# Patient Record
Sex: MALE | Race: WHITE
[De-identification: names, ages, dates, MRNs, and addresses within clinical notes are randomized per-mention and may not be internally consistent; named-entity substitution may affect disease eponyms.]

---

## 2017-09-20 ENCOUNTER — HOSPITAL ENCOUNTER (INPATIENT)
Dept: HOSPITAL 74 - JER | Age: 37
LOS: 3 days | Discharge: HOME HEALTH | DRG: 710 | End: 2017-09-23
Attending: INTERNAL MEDICINE | Admitting: INTERNAL MEDICINE
Payer: COMMERCIAL

## 2017-09-20 VITALS — BODY MASS INDEX: 35.8 KG/M2

## 2017-09-20 DIAGNOSIS — Z87.891: ICD-10-CM

## 2017-09-20 DIAGNOSIS — Z88.0: ICD-10-CM

## 2017-09-20 DIAGNOSIS — A41.9: Primary | ICD-10-CM

## 2017-09-20 DIAGNOSIS — E11.65: ICD-10-CM

## 2017-09-20 DIAGNOSIS — R00.0: ICD-10-CM

## 2017-09-20 DIAGNOSIS — E66.8: ICD-10-CM

## 2017-09-20 DIAGNOSIS — K61.1: ICD-10-CM

## 2017-09-20 DIAGNOSIS — I10: ICD-10-CM

## 2017-09-20 LAB
ALBUMIN SERPL-MCNC: 3 G/DL (ref 3.4–5)
ALP SERPL-CCNC: 182 U/L (ref 45–117)
ALT SERPL-CCNC: 32 U/L (ref 12–78)
ANION GAP SERPL CALC-SCNC: 8 MMOL/L (ref 8–16)
APPEARANCE UR: CLEAR
APTT BLD: 27.8 SECONDS (ref 26.9–34.4)
AST SERPL-CCNC: 16 U/L (ref 15–37)
BASOPHILS # BLD: 1.3 % (ref 0–2)
BILIRUB SERPL-MCNC: 0.3 MG/DL (ref 0.2–1)
BILIRUB UR STRIP.AUTO-MCNC: NEGATIVE MG/DL
CALCIUM SERPL-MCNC: 8.9 MG/DL (ref 8.5–10.1)
CO2 SERPL-SCNC: 25 MMOL/L (ref 21–32)
COLOR UR: (no result)
CREAT SERPL-MCNC: 0.6 MG/DL (ref 0.7–1.3)
DEPRECATED RDW RBC AUTO: 13 % (ref 11.9–15.9)
EOSINOPHIL # BLD: 0.5 % (ref 0–4.5)
GLUCOSE SERPL-MCNC: 263 MG/DL (ref 74–106)
HIV 1 & 2 AB: NEGATIVE
HIV 1 AGP24: NEGATIVE
INR BLD: 1.03 (ref 0.82–1.09)
KETONES UR QL STRIP: (no result)
LEUKOCYTE ESTERASE UR QL STRIP.AUTO: NEGATIVE
MCH RBC QN AUTO: 29.4 PG (ref 25.7–33.7)
MCHC RBC AUTO-ENTMCNC: 33.8 G/DL (ref 32–35.9)
MCV RBC: 87 FL (ref 80–96)
NEUTROPHILS # BLD: 77.9 % (ref 42.8–82.8)
NITRITE UR QL STRIP: NEGATIVE
PH UR: 5 [PH] (ref 5–8)
PLATELET # BLD AUTO: 281 K/MM3 (ref 134–434)
PMV BLD: 8.3 FL (ref 7.5–11.1)
PROT SERPL-MCNC: 6.8 G/DL (ref 6.4–8.2)
PROT UR QL STRIP: (no result)
PROT UR QL STRIP: NEGATIVE
PT PNL PPP: 11.3 SEC (ref 9.98–11.88)
RBC # UR STRIP: NEGATIVE /UL
SP GR UR: 1.01 (ref 1–1.02)
UROBILINOGEN UR STRIP-MCNC: NEGATIVE MG/DL (ref 0.2–1)
WBC # BLD AUTO: 12.4 K/MM3 (ref 4–10)

## 2017-09-20 PROCEDURE — G0008 ADMIN INFLUENZA VIRUS VAC: HCPCS

## 2017-09-20 RX ADMIN — INSULIN ASPART SCH UNITS: 100 INJECTION, SOLUTION INTRAVENOUS; SUBCUTANEOUS at 21:47

## 2017-09-20 RX ADMIN — METRONIDAZOLE SCH MLS/HR: 500 INJECTION, SOLUTION INTRAVENOUS at 18:08

## 2017-09-20 NOTE — HP
CHIEF COMPLAINT: perianal pain 





PCP: None 





HISTORY OF PRESENT ILLNESS:


36 yo M with no signifcant PMHx presents to ED with one week history of 

worsening perianal pain. Approximately 5 days prior to visit he started with 

pulsating pain of his left medial gluteus that subsequently became more painful 

and red. He went to walk in clinic and was given hydrocortisone cream for the 

redness. Pain worsened and redness spread. He then developed a draining 

abscess. For past day pain had gotten to point where he could no longer sit and 

even ambulating became difficult, which prompted trip to ED. He endorses 

subjective fevers and chills. No prior similar events in past. Denies CP, HA, 

SOB, palpitations, abdominal pain, N/V. No sick contacts or recents travel. 





ER course was notable for:


(1)CBC shows WBC - 12.4 and tachycardic HR 99 = sepsis 


(2)CT A/P shows inflammatory changes but no discrete abscess. 


(3)Hyperglycemia- random glucose 263





Recent Travel:Denies





PAST MEDICAL HISTORY:none





PAST SURGICAL HISTORY:none





Social History:


Smoking:never 


Alcohol:socially 


Drugs: denies





Family History: non-contributory 


Allergies





Penicillins Allergy (Mild, Verified 09/20/17 12:30)


 Hives








HOME MEDICATIONS:


 Home Medications











 Medication  Instructions  Recorded


 


NK [No Known Home Medication]  09/20/17








REVIEW OF SYSTEMS


CONSTITUTIONAL: fever, chills, diaphoresis


Absent:  , generalized weakness, malaise, loss of appetite, weight change


HEENT: 


Absent:  rhinorrhea, nasal congestion, throat pain, throat swelling, difficulty 

swallowing, mouth swelling, ear pain, eye pain, visual changes


CARDIOVASCULAR: 


Absent: chest pain, syncope, palpitations, irregular heart rate, lightheadedness

, peripheral edema


RESPIRATORY: 


Absent: cough, shortness of breath, dyspnea with exertion, orthopnea, wheezing, 

stridor, hemoptysis


GASTROINTESTINAL:


Absent: abdominal pain, abdominal distension, nausea, vomiting, diarrhea, 

constipation, melena, hematochezia


GENITOURINARY: 


Absent: dysuria, frequency, urgency, hesitancy, hematuria, flank pain, genital 

pain


MUSCULOSKELETAL: 


Absent: myalgia, arthralgia, joint swelling, back pain, neck pain


SKIN: painful lesion of left buttocks. 


Absent: rash, itching, pallor


HEMATOLOGIC/IMMUNOLOGIC: 


Absent: easy bleeding, easy bruising, lymphadenopathy, frequent infections


ENDOCRINE:


Absent: unexplained weight gain, unexplained weight loss, heat intolerance, 

cold intolerance


NEUROLOGIC: 


Absent: headache, focal weakness or paresthesias, dizziness, unsteady gait, 

seizure, mental status changes, bladder or bowel incontinence


PSYCHIATRIC: 


Absent: anxiety, depression, suicidal or homicidal ideation, hallucinations.








PHYSICAL EXAMINATION





GENERAL: AAOx3, mild distress


HEAD: NC/AT


EYES:PERRLA, EOMI, sclera anicteric, conjunctiva clear. No lid lag.


EARS, NOSE, THROAT: Dry mucous membranes.


NECK: supple, No jvd


LUNGS: CTAB. No wheezes, and no crackles. No accessory muscle use.


HEART: RRR, S1S2 normal , no m/g/r


ABDOMEN: Soft, nontender, not distended, normoactive bowel sounds, no guarding, 

no rebound, no masses.  No hepatomegaly or  splenomegaly. 


MUSCULOSKELETAL: Normal range of motion at all joints. No bony deformities or 

tenderness. No CVA tenderness.


UPPER EXTREMITIES: 2+ pulses, warm, well-perfused. No cyanosis. No clubbing. No 

peripheral edema.


LOWER EXTREMITIES: 2+ pulses, warm, well-perfused. No calf tenderness. No 

peripheral edema. 


NEUROLOGICAL:  Cranial nerves II-XII intact. Normal speech. gait po


PSYCHIATRIC: Cooperative. Good eye contact. Appropriate mood and affect.


SKIN: right gluteal draining 2x2cm abscess with surrounding erythema. 





ASSESSMENT/PLAN:


36 yo M with no signifcant PMHx presents to ED with one week history of 

worsening perianal pain admitted to med/sug for sepsis secondary to rey-anal 

abscess. 





Problem List





- Problem


(1) Sepsis


Assessment/Plan: 


* secondary to rey-anal abscess. 


* Blood and wound culture sent. 


* Repeat Lactic acid pending. 


* Started on Levaquin, Zosyn, and metronidazole in ED will continue Levaquin 

and Flagyl 


* IVF NS 1L Bolus x 2 bags.


* Pain control with morphine 4mg Q4H


* Dr. Garcia consulted for possible  I&D 











(2) Hyperglycemia


Assessment/Plan: 


* HbgA1C pending 


* Diabetic diet. 











Visit type





- Emergency Visit


Emergency Visit: Yes


ED Registration Date: 09/20/17


Care time: The patient presented to the Emergency Department on the above date 

and was hospitalized for further evaluation of their emergent condition.





- New Patient


This patient is new to me today: Yes


Date on this admission: 09/20/17





- Critical Care


Critical Care patient: No

## 2017-09-20 NOTE — PN
Teaching Attending Note


Name of Resident: Valentin Osei


ATTENDING PHYSICIAN STATEMENT





I saw and evaluated the patient.


I reviewed the resident's note and discussed the case with the resident.


I agree with the resident's findings and plan as documented.








SUBJECTIVE:38yo M with no PMH presented with buttock pain x 6 days. pt states 

pain has been progressively worsening since then and then noticed. noted 

yesterday it started draining some fluid. pt states he never had similar 

episode in the past. 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 

stretches. denies CP, SOB, fever, chills, N/V/C/D. was told 6 years ago he was 

diabetic and was started on medication (does not recall name) but states he 

started eating healthy and exercising and lost 44 lbs then stopped taking the 

medication for himself. states he is only sexually active with his wife. denies 

IVDA or foreign body insertion into his rectum.  








OBJECTIVE:


 Last Vital Signs











Temp Pulse Resp BP Pulse Ox


 


 99.2 F   86   18   141/88   98 


 


 09/20/17 15:25  09/20/17 15:25  09/20/17 15:25  09/20/17 15:25  09/20/17 15:42








General NAD


Skin L buttock with 3x3 cm abscess with active pus drainage tender to light 

palpation with large surrounding area of erythema





ASSESSMENT AND PLAN:


38yo M with no PMH presented with R buttock pain


1. Sepsis due to L buttock abscess and surrounding erythema- Medicine 

admission. (leukocytosis and tachycardia) CT negative for discrete abscess 

however active drainage is required. Surgery consulted for I&D. NPO tonight for 

likely I&D in the AM. started on levaquin and flagyl due to PCN allergy. start 

IVF. lactic acid normal. pain contro. Cx obtained.  pt consented to HIV 

testing. check HIV status.


2. Hypergylcemia- is likely diabetic as also has glucosuria. check A1c. start 

BGM and ISS at this time. 


3. DVT ppx- lovenox

## 2017-09-20 NOTE — PDOC
History of Present Illness





- General


Chief Complaint: Abscess Boil


Stated Complaint: INFECTION ON BUTTOCKS


Time Seen by Provider: 09/20/17 11:30


History Source: Patient


Exam Limitations: No Limitations





- History of Present Illness


Initial Comments: 





09/20/17 12:27


37 yr male with c/o painful abscess to rectal area started 5-7 days ago now 

worse and draining with chills last night. Pt has neg abd pain or vomiting, no 

past medical history. Pt works at a desk sitting long hours. Pt has no PMD, was 

seen at the clinic last friday . 


Timing/Duration: reports: week


Severity: Yes: moderate


Location: reports: other (rectal/perianal )





Past History





- Past Medical History


Allergies/Adverse Reactions: 


 Allergies











Allergy/AdvReac Type Severity Reaction Status Date / Time


 


Penicillins Allergy Mild Hives Verified 09/20/17 12:30











Home Medications: 


Ambulatory Orders





NK [No Known Home Medication]  09/20/17 











- Suicide/Smoking/Psychosocial Hx


Smoking History: Former smoker


Have you smoked in the past 12 months: No


If you are a former smoker, when did you quit?: 13 YRS AGO


Information on smoking cessation initiated: No


Hx Alcohol Use: No


Drug/Substance Use Hx: No


Substance Use Type: None





**Review of Systems





- Review of Systems


Able to Perform ROS?: Yes


Is the patient limited English proficient: No


Constitutional: Yes: Symptoms Reported, Chills


HEENTM: No: Symptoms Reported


Respiratory: No: Symptoms reported


Cardiac (ROS): No: Symptoms Reported


ABD/GI: Yes: Symptoms Reported, See HPI, Constipated


: No: Symptoms Reported


Musculoskeletal: No: Symptoms Reported


Integumentary: Yes: See HPI





*Physical Exam





- Vital Signs


 Last Vital Signs











Temp Pulse Resp BP Pulse Ox


 


 98.4 F   99 H  20   146/105   97 


 


 09/20/17 10:38  09/20/17 10:38  09/20/17 10:38  09/20/17 10:38  09/20/17 10:38














- Physical Exam


General Appearance: Yes: Nourished, Appropriately Dressed


HEENT: positive: EOMI, CLARENCE, Normal ENT Inspection, TMs Normal, Pharynx Normal


Neck: positive: Supple.  negative: Tender


Respiratory/Chest: positive: Lungs Clear, Normal Breath Sounds


Cardiovascular: positive: Regular Rhythm, Regular Rate


Gastrointestinal/Abdominal: positive: Normal Bowel Sounds, Soft.  negative: 

Tender


Male Genitalia: positive: normal genitalia


Rectal Exam: positive: other (tender indurated mass to the left perirectal area 

approximately 6cm extending to the buttock with drainage , )


Musculoskeletal: positive: Normal Inspection


Extremity: positive: Normal Inspection, Normal Range of Motion


Integumentary: positive: Normal Color, Dry, Warm


Neurologic: positive: Fully Oriented, Alert, Normal Mood/Affect, Normal Response

, Motor Strength 5/5





ED Treatment Course





- LABORATORY


CBC & Chemistry Diagram: 


 09/21/17 06:00





 09/21/17 06:00





- ADDITIONAL ORDERS


Additional order review: 


 Laboratory  Results











  09/20/17





  11:57


 


PT with INR  11.30


 


INR  1.03


 


PTT (Actin FS)  27.8








 











  09/20/17





  11:57


 


RBC  5.08


 


MCV  87.0


 


MCHC  33.8


 


RDW  13.0


 


MPV  8.3


 


Neutrophils %  77.9


 


Lymphocytes %  11.6


 


Monocytes %  8.7


 


Eosinophils %  0.5


 


Basophils %  1.3














- RADIOLOGY


Radiology Studies Ordered: 














 Category Date Time Status


 


 ABDOMEN & PELVIS CT WITH CONTR [CT] Stat CT Scan  09/20/17 11:39 Ordered














- Medications


Given in the ED: 


ED Medications














Discontinued Medications














Generic Name Dose Route Start Last Admin





  Trade Name Freq  PRN Reason Stop Dose Admin


 


Ampicillin Sodium/Sulbactam  100 mls @ 200 mls/hr 09/20/17 11:44 09/20/17 12:16





  Sodium 3 gm/ Sodium Chloride  IVPB 09/20/17 12:13  Not Given





  ONCE ONE   














- Consult/PCP


Time Called: 13:05


Case discussed with personal care physician: Aaron Perez


Consult Reason/Comments: case discussed and will admit , will call 





- Additional Consults


Time Called: 13:14


Consult/PCP:  





Progress Note





- Progress Note


Progress Note: 


after ordering Unaysn pt told the RN he was allergic to PCN. I have canceled 

and ordered levaquin and Flagyl. 














Medical Decision Making





- Medical Decision Making


09/20/17 12:29


cc: rectal pain with abscess for one week had chills and drainage start last 

night


pt is unable to sit or lie on back due to pain 


pt denies and PMH


will do labs, IVAB , Ct pelvis to r/o rectal abscess


pt has induration with fluctuance to the per rectal area extending to the  left 

buttock 


 case discussed in detail with ER attending Dr. Carlos and agrees with plan to 

admit for IVAB


      





09/20/17 12:48


after ordering Unaysn pt told the RN he was allergic to PCN. I have canceled 

and ordered Levaquin and Flagyl. 





09/20/17 14:38


 called the ER states the patient has no insurance so pt is to be 

admitted to the hospitalist. I have spoke to 's office and I have 

canceled  as the pt is no longer admitted to . 





I have spoke to  admitting hospitalist and has accepted the 

patient. 


I have spoke to on call surgery  and he will consult on the patient. 





09/20/17 14:54








*DC/Admit/Observation/Transfer


Diagnosis at time of Disposition: 


 Perirectal abscess





- Discharge Dispostion


Admit: Yes





- Referrals

## 2017-09-20 NOTE — HP
CHIEF COMPLAINT: big bubble in his buttocks





PCP: Aaron Perez MD 





HISTORY OF PRESENT ILLNESS:


38 YO male with no past medical history , who presented to [facility] ed with one week 

history of rey-anal abscess . He noticed some redness on his buttocks last 

week , that prompted him to see , within 3 days it compose a 2x2 cm 

abscess that start draining some pussy fluids, and blood . patient reports 

fever and chills last night, associated with pain in the area 8/10 improved 

with pain killer and loss of appetite for 3 days because he is scared to use 

the bathroom. Patient was found to be tachycardic of 86, and wbc 12.4 and he 

was admitted to med-surg for sepsis. Patient denies any chest pain, SOB, 

abdominal pain, N/V/D/C, or any urinary symptoms. Patient has a history of 

using diabetes medicine for 3 months but does not know the name, and was 

advised by his doctor to loose weight. Patient is  and only have sex 

with his wife.  he denies any recent travel or sick contact , but has an office 

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


ER course was notable for:


(1) wbc 12.4, HR 86, /88, Glu 263


(2) 1000 CC NS bolus 


(3) CT A/P shows inflammatory changes but no discrete abscess. 





Recent Travel: NO 





PAST MEDICAL HISTORY: pre-diabetics .





PAST SURGICAL HISTORY:None





Social History:


Smoking:never


Alcohol: socially


Drugs: never





Family History: Father has DM and HTN. 


Allergies





Penicillins Allergy (Mild, Verified 09/20/17 12:30)


 Hives








HOME MEDICATIONS:


 Home Medications











 Medication  Instructions  Recorded


 


NK [No Known Home Medication]  09/20/17








REVIEW OF SYSTEMS


CONSTITUTIONAL: 


Absent:  fever, chills, diaphoresis, generalized weakness, malaise, loss of 

appetite, weight change


HEENT: 


Absent:  rhinorrhea, nasal congestion, throat pain, throat swelling, difficulty 

swallowing, mouth swelling, ear pain, eye pain, visual changes


CARDIOVASCULAR: 


Absent: chest pain, syncope, palpitations, irregular heart rate, lightheadedness

, peripheral edema


RESPIRATORY: 


Absent: cough, shortness of breath, dyspnea with exertion, orthopnea, wheezing, 

stridor, hemoptysis


GASTROINTESTINAL:


Absent: abdominal pain, abdominal distension, nausea, vomiting, diarrhea, 

constipation, melena, hematochezia


GENITOURINARY: 


Absent: dysuria, frequency, urgency, hesitancy, hematuria, flank pain, genital 

pain


MUSCULOSKELETAL: 


Absent: myalgia, arthralgia, joint swelling, back pain, neck pain


SKIN: 


Absent: rash, itching, pallor, 2x2 cm  draining abscess  on his left buttock  

with erythema in rey anal area.


HEMATOLOGIC/IMMUNOLOGIC: 


Absent: easy bleeding, easy bruising, lymphadenopathy, frequent infections


ENDOCRINE:


Absent: unexplained weight gain, unexplained weight loss, heat intolerance, 

cold intolerance


NEUROLOGIC: 


Absent: headache, focal weakness or paresthesias, dizziness, unsteady gait, 

seizure, mental status changes, bladder or bowel incontinence


PSYCHIATRIC: 


Absent: anxiety, depression, suicidal or homicidal ideation, hallucinations.








PHYSICAL EXAMINATION


 Vital Signs - 24 hr











  09/20/17 09/20/17





  15:25 15:42


 


Temperature 99.2 F 


 


Pulse Rate 86 


 


Respiratory 18 





Rate  


 


Blood Pressure 141/88 


 


O2 Sat by Pulse  98





Oximetry (%)  











GENERAL: Awake, alert, and fully oriented, in no acute distress.


HEAD: Normal with no signs of trauma.


EYES:  sclera anicteric, conjunctiva clear.


EARS, NOSE, THROAT:  Moist mucous membranes.





LUNGS: Breath sounds equal, clear to auscultation bilaterally. No wheezes, and 

no crackles. No accessory muscle use.


HEART: Regular rate and rhythm, normal S1 and S2 without murmur, rub or gallop.


ABDOMEN: Soft, nontender, not distended, normoactive bowel sounds, no guarding, 

no rebound tenderness.


MUSCULOSKELETAL: Normal range of motion at all joints. No bony deformities or 

tenderness. No CVA tenderness.





LOWER EXTREMITIES: 2+ pulses, warm, well-perfused. No calf tenderness. No 

peripheral edema. 


NEUROLOGICAL:  good mentation. 


PSYCHIATRIC: Cooperative. Good eye contact. Appropriate mood and affect.


SKIN: Warm, dry, no rashes or lesions noted, 2x2 cm abscess in hes left 

buttocks with rey anal erythema.





ASSESSMENT/PLAN:


 is 37 year old male with no past medical history who presented to 

the ED with one week history of rey-anal abscess was found to have sepsis and 

was admitted for further evaluation and treatment. 





# Sepsis 2/2 rey-anal abscess


* wbc 12.4 , HR 84, source of infection


* Blood culture,urine culture,  wound  culture pending 


* repeat lctic acid pending


* IV NS @ 1000 CC bolus was given in tenED, will give him another 2000 CC 


* Iv abx levaquin 500 once and 500 flagyl was given in the ED for one dose


* Continue IV abx daily for 7 days (Levaquin 500 mg qd, Flagyl 500 mg IV q 6 hr)

, allergic to penicillin.


* Morphine 1 mg IVbp Q4hr PRN


*   surgery consulted for I&D  


* NPO after mid night for possible OR procedure 


* HIV screening test 


* 


* 


# Hyperglycemia, 


* Glu 263 on admission , 


* UA : +3 glucose, +2 keton


* HgA1c 


* BGM , ISS, 


* will monitor blood sugar


* Patient education 


* encourage weight loss


* 


# High blood pressure, 


* 141/88 on admission 


* monitor Bp 


* Low sodium diet, diabetic diet


* 


#Proph, 


* DVT : Moderate risk , SCDS both legs 


* GI: No need for now


* 


#FEN:


* 2000 CC NS bolus 


* WNL 


* Low sodium, diabetic diet. 


* 


#Dispo: 


* admit to med-surg 





Visit type





- Emergency Visit


Emergency Visit: Yes


ED Registration Date: 09/20/17


Care time: The patient presented to the Emergency Department on the above date 

and was hospitalized for further evaluation of their emergent condition.





- New Patient


This patient is new to me today: Yes


Date on this admission: 09/20/17





- Critical Care


Critical Care patient: No

## 2017-09-20 NOTE — CONSULT
Consult


Consult Specialty:: Surgery


Reason for Consultation:: Left perirectal/perianal abscess





- History of Present Illness


History of Present Illness: 


37 male presents for tenderness, redness x several days at left perirectal/

perianal area


Noticed some discharge form the area


1st episode


Denies history of DM


No subjective fevers/chills





- History Source


History Provided By: Patient, Medical Record


Limitations to Obtaining History: No Limitations





- Past Medical History


Endocrine: Yes: Diabetes Mellitus





- Past Surgical History


Past Surgical History: Yes: None





- Alcohol/Substance Use


Hx Alcohol Use: No





- Smoking History


Smoking history: Former smoker


Have you smoked in the past 12 months: No


If you are a former smoker, when did you quit?: 13 YRS AGO





Home Medications





- Allergies


Allergies/Adverse Reactions: 


 Allergies











Allergy/AdvReac Type Severity Reaction Status Date / Time


 


Penicillins Allergy Mild Hives Verified 09/20/17 12:30














- Home Medications


Home Medications: 


Ambulatory Orders





NK [No Known Home Medication]  09/20/17 











Family Disease History





- Family Disease History


Family History: Denies





Review of Systems





- Review of Systems


Constitutional: denies: Chills, Fever


HENT: reports: No Symptoms


Neck: reports: No Symptoms


Cardiovascular: denies: Chest Pain


Respiratory: denies: Cough


Gastrointestinal: denies: Abdominal Pain


Genitourinary: reports: No Symptoms


Integumentary: reports: Erythema, Other (+ tenderness at left perirectal area)


Neurological: denies: Change in LOC


Pain Intensity: 4





Physical Exam


Vital Signs: 


 Vital Signs











Temperature  99.2 F   09/20/17 15:25


 


Pulse Rate  86   09/20/17 15:25


 


Respiratory Rate  18   09/20/17 15:25


 


Blood Pressure  141/88   09/20/17 15:25


 


O2 Sat by Pulse Oximetry (%)  98   09/20/17 15:42











Constitutional: Yes: Calm.  No: No Distress


Neck: Yes: WNL


Cardiovascular: Yes: Regular Rate and Rhythm


Respiratory: Yes: Regular


Gastrointestinal: Yes: Soft.  No: Tenderness, Tenderness, Rebound


Extremities: Yes: WNL


Integumentary: Yes: Other (+ erythema and tenderness at left perirectal area, 

unable to perform rectal exam due to tenderness, + some purulent discharge and 

induration)


Neurological: Yes: Alert, Oriented


Labs: 


 CBC, BMP





 09/20/17 11:57 





 09/20/17 11:57 











Imaging





- Results


Cat Scan: Report Reviewed, Image Reviewed





Problem List





- Problems


(1) Perirectal abscess


Code(s): K61.1 - RECTAL ABSCESS








Assessment/Plan


37 male with left perirectal abscess


NPO


IV fluids


Antibiotics


For Incision and Drainage in am


Risks and benefits explained


Agrees

## 2017-09-20 NOTE — HP
Admitting History and Physical





- Admission


Chief Complaint: rectal pain 5 days.  swelling.  mistaking for hemorroids.  wbc 

high


History of Present Illness: 


glucose ? high ? dm


History Source: Patient


Limitations to Obtaining History: No Limitations





- Past Medical History


Endocrine: Yes: Diabetes Mellitus





- Past Surgical History


Past Surgical History: Yes: None





- Smoking History


Smoking history: Former smoker


Have you smoked in the past 12 months: No


If you are a former smoker, when did you quit?: 13 YRS AGO





- Alcohol/Substance Use


Hx Alcohol Use: No





Home Medications





- Allergies


Allergies/Adverse Reactions: 


 Allergies











Allergy/AdvReac Type Severity Reaction Status Date / Time


 


Penicillins Allergy Mild Hives Verified 09/20/17 12:30














- Home Medications


Home Medications: 


Ambulatory Orders





NK [No Known Home Medication]  09/20/17 











Physical Examination


Vital Signs: 


 Vital Signs











Temperature  98.4 F   09/20/17 10:38


 


Pulse Rate  99 H  09/20/17 10:38


 


Respiratory Rate  20   09/20/17 10:38


 


Blood Pressure  146/105   09/20/17 10:38


 


O2 Sat by Pulse Oximetry (%)  97   09/20/17 10:38

## 2017-09-21 LAB
ANION GAP SERPL CALC-SCNC: 8 MMOL/L (ref 8–16)
BASOPHILS # BLD: 0.6 % (ref 0–2)
CALCIUM SERPL-MCNC: 8.4 MG/DL (ref 8.5–10.1)
CO2 SERPL-SCNC: 27 MMOL/L (ref 21–32)
CREAT SERPL-MCNC: 0.5 MG/DL (ref 0.7–1.3)
DEPRECATED RDW RBC AUTO: 13.1 % (ref 11.9–15.9)
EOSINOPHIL # BLD: 1.5 % (ref 0–4.5)
GLUCOSE SERPL-MCNC: 187 MG/DL (ref 74–106)
MCH RBC QN AUTO: 29.6 PG (ref 25.7–33.7)
MCHC RBC AUTO-ENTMCNC: 33.9 G/DL (ref 32–35.9)
MCV RBC: 87.2 FL (ref 80–96)
NEUTROPHILS # BLD: 68.9 % (ref 42.8–82.8)
PLATELET # BLD AUTO: 281 K/MM3 (ref 134–434)
PMV BLD: 8.5 FL (ref 7.5–11.1)
WBC # BLD AUTO: 7 K/MM3 (ref 4–10)

## 2017-09-21 PROCEDURE — 0JB90ZZ EXCISION OF BUTTOCK SUBCUTANEOUS TISSUE AND FASCIA, OPEN APPROACH: ICD-10-PCS | Performed by: SURGERY

## 2017-09-21 PROCEDURE — 0D9P0ZX DRAINAGE OF RECTUM, OPEN APPROACH, DIAGNOSTIC: ICD-10-PCS | Performed by: SURGERY

## 2017-09-21 RX ADMIN — INSULIN ASPART SCH: 100 INJECTION, SOLUTION INTRAVENOUS; SUBCUTANEOUS at 12:31

## 2017-09-21 RX ADMIN — INSULIN ASPART SCH UNIT: 100 INJECTION, SOLUTION INTRAVENOUS; SUBCUTANEOUS at 21:04

## 2017-09-21 RX ADMIN — METRONIDAZOLE SCH MLS/HR: 500 INJECTION, SOLUTION INTRAVENOUS at 02:30

## 2017-09-21 RX ADMIN — METRONIDAZOLE SCH MLS/HR: 500 INJECTION, SOLUTION INTRAVENOUS at 18:33

## 2017-09-21 RX ADMIN — INSULIN DETEMIR SCH UNIT: 100 INJECTION, SOLUTION SUBCUTANEOUS at 21:03

## 2017-09-21 RX ADMIN — HYDROMORPHONE HYDROCHLORIDE PRN MG: 1 INJECTION, SOLUTION INTRAMUSCULAR; INTRAVENOUS; SUBCUTANEOUS at 16:35

## 2017-09-21 RX ADMIN — INSULIN ASPART SCH: 100 INJECTION, SOLUTION INTRAVENOUS; SUBCUTANEOUS at 16:45

## 2017-09-21 RX ADMIN — INSULIN ASPART SCH: 100 INJECTION, SOLUTION INTRAVENOUS; SUBCUTANEOUS at 06:22

## 2017-09-21 RX ADMIN — METRONIDAZOLE SCH MLS/HR: 500 INJECTION, SOLUTION INTRAVENOUS at 10:22

## 2017-09-21 NOTE — EKG
Test Reason : 

Blood Pressure : ***/*** mmHG

Vent. Rate : 087 BPM     Atrial Rate : 087 BPM

   P-R Int : 164 ms          QRS Dur : 092 ms

    QT Int : 360 ms       P-R-T Axes : 028 008 004 degrees

   QTc Int : 433 ms

 

NORMAL SINUS RHYTHM

NORMAL ECG

NO PREVIOUS ECGS AVAILABLE

Confirmed by LEXI MUHAMMAD MD ([id number]) on 9/21/2017 2:40:59 PM

 

Referred By:             Confirmed By:LEXI MUHAMMAD MD

## 2017-09-21 NOTE — OP
Operative Note





- Note:


Operative Date: 09/21/17


Pre-Operative Diagnosis: Left perirectal abscess


Operation: Incision and drainage of left perirectal abscess, excisional 

debridement of necrotic subcutaeous tissue


Findings: 


Large abscess cavity


Approximately 8cm x 8cm x 8cm


Post-Operative Diagnosis: Same as Pre-op


Surgeon: Tc Garcia


Anesthesia: General


Specimens Removed: Abscess cavity content, culture, necrotic subcutaneous tissue


Estimated Blood Loss (mls): 5


Operative Report Dictated: Yes

## 2017-09-21 NOTE — PN
Teaching Attending Note


Name of Resident: Valentin Osei


ATTENDING PHYSICIAN STATEMENT





I saw and evaluated the patient.


I reviewed the resident's note and discussed the case with the resident.


I agree with the resident's findings and plan as documented.








SUBJECTIVE:pt was seen prior to I&D


states pain is controlled as long as he does not lay on that side. denies Cp, 

SOB, fever, chills, N/V/C/D








OBJECTIVE:


 Last Vital Signs











Temp Pulse Resp BP Pulse Ox


 


 98.7 F   71   18   118/75   98 


 


 09/21/17 10:38  09/21/17 10:38  09/21/17 10:38  09/21/17 10:38  09/21/17 09:00








General NAD


Skin L buttock with 3x3 cm abscess with active pus drainage tender to light 

palpation with large surrounding area of erythema





ASSESSMENT AND PLAN:


38yo M with no PMH presented with R buttock pain


1. Sepsis due to L buttock abscess and surrounding erythema- increased drainage 

from abscess. NPO for OR and I&D. will f/u Cx report. on Levaquin and Flagyl 

day 2. HIV negative. pain control. 


2. New onset DM- A1c 10.9. counseled on risks of uncontrolled DM including but 

not limited to vision loss, ACS, kidney injury which can lead to HD and 

uncontrollable infections which can lead to amputations. nutrition consult. 

start levemir 5 units tonight as is insulin naive. marycruz BGM and iss. diabetic 

diet.


3. DVT ppx- lovenox

## 2017-09-21 NOTE — PN
Physical Exam: 


SUBJECTIVE: Patient seen and examined at bedside. No acute events over night. 

buttock pain is controlled. He denies any fever, chills, N/V/D/C. 


the abscess still draining fluids and puss.








OBJECTIVE:





 Vital Signs











 Period  Temp  Pulse  Resp  BP Sys/Morejon  Pulse Ox


 


 Last 24 Hr  97.8 F-99.2 F  69-86  18-20  118-141/70-88  98-99











GENERAL: The patient is awake, alert, and fully oriented, in no acute distress.


HEAD: Normal with no signs of trauma.


EYES:  sclera anicteric, conjunctiva clear. No ptosis. 


ENT:  moist mucous membranes.


LUNGS: Breath sounds equal, clear to auscultation bilaterally, no wheezes, no 

crackles, no 


accessory muscle use. 


HEART: Regular rate and rhythm, S1, S2 without murmur, rub or gallop.


ABDOMEN: Soft, nontender, nondistended, normoactive bowel sounds, no guarding, 

no 


rebound tenderness.


EXTREMITIES:  warm, well-perfused, no edema. 


NEUROLOGICAL: good mentation 


PSYCH: Normal mood, normal affect.


SKIN: Warm, dry,  no rashes or lesions noted, 3x3 cm rey-rectal draining 

abscess with 8x8 cm erythema and cellulitis.














 Laboratory Results - last 24 hr











  09/20/17 09/20/17 09/21/17





  17:00 21:46 06:00


 


WBC    7.0  D


 


RBC    4.73


 


Hgb    14.0


 


Hct    41.2


 


MCV    87.2


 


MCH    29.6


 


MCHC    33.9


 


RDW    13.1


 


Plt Count    281


 


MPV    8.5


 


Neutrophils %    68.9


 


Lymphocytes %    18.8  D


 


Monocytes %    10.2


 


Eosinophils %    1.5  D


 


Basophils %    0.6


 


Sodium   


 


Potassium   


 


Chloride   


 


Carbon Dioxide   


 


Anion Gap   


 


BUN   


 


Creatinine   


 


POC Glucometer   299 


 


Random Glucose   


 


Hemoglobin A1c %   


 


Calcium   


 


HIV 1&2 Antibody Screen  Negative  


 


HIV P24 Antigen  Negative  














  09/21/17 09/21/17 09/21/17





  06:00 06:00 06:20


 


WBC   


 


RBC   


 


Hgb   


 


Hct   


 


MCV   


 


MCH   


 


MCHC   


 


RDW   


 


Plt Count   


 


MPV   


 


Neutrophils %   


 


Lymphocytes %   


 


Monocytes %   


 


Eosinophils %   


 


Basophils %   


 


Sodium  141  


 


Potassium  4.2  


 


Chloride  106  


 


Carbon Dioxide  27  


 


Anion Gap  8  


 


BUN  10  


 


Creatinine  0.5 L  


 


POC Glucometer    182


 


Random Glucose  187 H D  


 


Hemoglobin A1c %   10.9 H 


 


Calcium  8.4 L  


 


HIV 1&2 Antibody Screen   


 


HIV P24 Antigen   








Active Medications











Generic Name Dose Route Start Last Admin





  Trade Name Freq  PRN Reason Stop Dose Admin


 


Fentanyl  50 mcg 09/21/17 12:39  





  Sublimaze Injection -  IVPUSH 09/24/17 12:40  





  [id number] PRN   





  PAIN   


 


Metronidazole  100 mls @ 100 mls/hr 09/20/17 18:00 09/21/17 10:22





  Flagyl 500mg Premixed Ivpb -  IVPB   100 mls/hr





  Q8H-IV ROSS   Administration


 


Levofloxacin  150 mls @ 100 mls/hr 09/21/17 10:00 09/21/17 10:22





  Levaquin 750 Mg Premixed Ivpb -  IVPB   100 mls/hr





  DAILY ROSS   Administration


 


Sodium Chloride  1,000 mls @ 125 mls/hr 09/20/17 23:00 09/20/17 23:29





  Normal Saline -  IV   125 mls/hr





  ASDIR ROSS   Administration


 


Lactated Ringer's  1,000 mls @ 75 mls/hr 09/21/17 12:45  





  Lactated Ringers Solution  IV   





  ASDIR ROSS   


 


Insulin Aspart  1 vial 09/20/17 22:00 09/21/17 12:31





  Novolog Vial Sliding Scale -  SQ   Not Given





  ACHS ROSS   





  Protocol   


 


Morphine Sulfate  1 mg 09/20/17 16:22  





  Morphine Injection -  IVPUSH   





  Q4H PRN   





  PAIN   


 


Ondansetron HCl  4 mg 09/21/17 12:39  





  Zofran Injection  IVPUSH 09/21/17 18:40  





  Q6H PRN   





  NAUSEA AND/OR VOMITING   








 CBC, BMP





 09/21/17 06:00 





 09/21/17 06:00 








Pelvic CT scan: 9/20/2017: No pelvic mass or fluid collection, no 

lymphadenopathy. inflammatory process without discrete abscess.





ASSESSMENT/PLAN:


 is 37 year old male with no past medical history who presented to 

the ED with one week history of rey-anal abscess was found to have sepsis and 

was admitted for further evaluation and treatment. 





# Sepsis 2/2 rey-anal abscess and cellulitis 


* day of admission wbc 12.4 , HR 84, with source of infection, improving to wbc 

7,  HR 80


* Blood culture,urine culture,  wound  culture pending 


* Iv abx levaquin 500 once and 500 flagyl was given in the ED for one dose


* Continue IV abx daily for 7 days (Levaquin 500 mg qd, Flagyl 500 mg IV q 6 hr)

, allergic to penicillin.


* Morphine 1 mg IVbp Q4hr PRN


*  surgery consulted for I&D  : Incision and drainage of left perirectal 

abscess, excisional debridement of necrotic subcutaeous tissue


* HIV screening test negative 


* Zofran for nausea


* 


# Diabetes, new onset  


* Glu 263 on admission , 


* UA : +3 glucose, +2 keton


* HgA1c 10.9


* start levemir 5 unit SQ HS


* BGM , ISS, 


* will monitor blood sugar


* Patient education for short and long term complications  


* encourage weight loss


* Nutritionist consultation 


* 


# elevated blood pressure, resolved likely 2/2 pain 


* 141/88 on admission , normotensive today 118/78


* monitor Bp 


* Low sodium diet, diabetic diet


* 


#Proph, 


* DVT : Moderate risk , SCDS both legs , lovenox 40 mg SQ daily.


* GI: No need for now


* 


#FEN:


* 2000 CC NS bolus at admission day , on no fluid now 


* WNL 


* Low sodium, diabetic diet. 


* 


#Dispo: 


* admit to med-surg 





Visit type





- Emergency Visit


Emergency Visit: Yes


ED Registration Date: 09/20/17


Care time: The patient presented to the Emergency Department on the above date 

and was hospitalized for further evaluation of their emergent condition.





- New Patient


This patient is new to me today: No





- Critical Care


Critical Care patient: No

## 2017-09-22 LAB
ANION GAP SERPL CALC-SCNC: 13 MMOL/L (ref 8–16)
BASOPHILS # BLD: 0.6 % (ref 0–2)
CALCIUM SERPL-MCNC: 8.8 MG/DL (ref 8.5–10.1)
CO2 SERPL-SCNC: 24 MMOL/L (ref 21–32)
CREAT SERPL-MCNC: 0.5 MG/DL (ref 0.7–1.3)
DEPRECATED RDW RBC AUTO: 13 % (ref 11.9–15.9)
EOSINOPHIL # BLD: 1 % (ref 0–4.5)
GLUCOSE SERPL-MCNC: 163 MG/DL (ref 74–106)
MCH RBC QN AUTO: 29.6 PG (ref 25.7–33.7)
MCHC RBC AUTO-ENTMCNC: 34 G/DL (ref 32–35.9)
MCV RBC: 87.1 FL (ref 80–96)
NEUTROPHILS # BLD: 74.6 % (ref 42.8–82.8)
PLATELET # BLD AUTO: 320 K/MM3 (ref 134–434)
PMV BLD: 8.3 FL (ref 7.5–11.1)
WBC # BLD AUTO: 9.3 K/MM3 (ref 4–10)

## 2017-09-22 RX ADMIN — INSULIN ASPART SCH: 100 INJECTION, SOLUTION INTRAVENOUS; SUBCUTANEOUS at 21:34

## 2017-09-22 RX ADMIN — LEVOFLOXACIN SCH MLS/HR: 5 INJECTION, SOLUTION INTRAVENOUS at 10:42

## 2017-09-22 RX ADMIN — INSULIN ASPART SCH: 100 INJECTION, SOLUTION INTRAVENOUS; SUBCUTANEOUS at 06:04

## 2017-09-22 RX ADMIN — ENOXAPARIN SODIUM SCH MG: 40 INJECTION SUBCUTANEOUS at 09:36

## 2017-09-22 RX ADMIN — METRONIDAZOLE SCH MLS/HR: 500 INJECTION, SOLUTION INTRAVENOUS at 01:18

## 2017-09-22 RX ADMIN — METRONIDAZOLE SCH MLS/HR: 500 INJECTION, SOLUTION INTRAVENOUS at 09:36

## 2017-09-22 RX ADMIN — INSULIN DETEMIR SCH UNIT: 100 INJECTION, SOLUTION SUBCUTANEOUS at 21:29

## 2017-09-22 RX ADMIN — HYDROMORPHONE HYDROCHLORIDE PRN MG: 1 INJECTION, SOLUTION INTRAMUSCULAR; INTRAVENOUS; SUBCUTANEOUS at 00:01

## 2017-09-22 RX ADMIN — HYDROMORPHONE HYDROCHLORIDE PRN MG: 1 INJECTION, SOLUTION INTRAMUSCULAR; INTRAVENOUS; SUBCUTANEOUS at 13:32

## 2017-09-22 RX ADMIN — METRONIDAZOLE SCH MLS/HR: 500 INJECTION, SOLUTION INTRAVENOUS at 17:30

## 2017-09-22 RX ADMIN — INSULIN ASPART SCH UNIT: 100 INJECTION, SOLUTION INTRAVENOUS; SUBCUTANEOUS at 11:40

## 2017-09-22 RX ADMIN — INSULIN ASPART SCH: 100 INJECTION, SOLUTION INTRAVENOUS; SUBCUTANEOUS at 17:38

## 2017-09-22 NOTE — PN
Progress Note (short form)





- Note


Progress Note: 


POD 1 


Pain controlled


 Vital Signs











 Period  Temp  Pulse  Resp  BP Sys/Morejon  Pulse Ox


 


 Last 24 Hr  97.9 F-100 F  72-83  16-20  120-148/69-89  








Wound packed with kerlex in betadine, dressing intact





 CBC, BMP





 09/22/17 06:30 





 09/22/17 06:30 





F/u cultures


Antibiotics


daily wound packing by RN


VNS ordered for home


Can discharge home per primary team





Problem List





- Problems


(1) Perirectal abscess


Code(s): K61.1 - RECTAL ABSCESS

## 2017-09-22 NOTE — PN
Teaching Attending Note


Name of Resident: Valentin Osei


ATTENDING PHYSICIAN STATEMENT





I saw and evaluated the patient.


I reviewed the resident's note and discussed the case with the resident.


I agree with the resident's findings and plan as documented.








SUBJECTIVE:some pain when going to the bathroom but overall improved. denies CP

, SOB, fever, chills, N/V/C/D








OBJECTIVE:





 Last Vital Signs











Temp Pulse Resp BP Pulse Ox


 


 100 F H  83   18   136/85   98 


 


 09/22/17 09:28  09/22/17 09:28  09/22/17 09:28  09/22/17 09:28 09/21/17 21:00








General NAD


Skin L buttock with bandage intact





ASSESSMENT AND PLAN:


36yo M with no PMH presented with R buttock pain


1. Sepsis due to L buttock abscess and surrounding erythema-s/p I&D 9/21 with 

[id number] necrotic abscess drained and packed. f/u Cx report. on Levaquin and 

Flagyl day 3. wound care per surgery. HIV negative. pain control. 


2. New onset DM- A1c 10.9. sugars improved. taught by RN how to self inject and 

check sugars. cont to titrate insulin to improve control. 


3. DVT ppx- lovenox


4. d/c planning tomorrow once Cx reports obtained

## 2017-09-22 NOTE — PN
Physical Exam: 


SUBJECTIVE: Patient seen and examined at bedside. He is feeling much better, he 

complains of pain only when changing the dressing. 


He denies any fever, chills, N/V/D/C.








OBJECTIVE:





 Vital Signs











 Period  Temp  Pulse  Resp  BP Sys/Morejon  Pulse Ox


 


 Last 24 Hr  97.9 F-100 F  72-87  16-20  132-146/78-88  98-98











GENERAL: The patient is awake, alert, and fully oriented, in no acute distress.


HEAD: Normal with no signs of trauma.


EYES: PERRL, extraocular movements intact, sclera anicteric, conjunctiva clear. 

No ptosis. 


ENT: Ears normal, nares patent, oropharynx clear without exudates, moist mucous 


membranes.


NECK: Trachea midline, full range of motion, supple. 


LUNGS: Breath sounds equal, clear to auscultation bilaterally, no wheezes, no 

crackles, no 


accessory muscle use. 


HEART: Regular rate and rhythm, S1, S2 without murmur, rub or gallop.


ABDOMEN: Soft, nontender, nondistended, normoactive bowel sounds, no guarding, 

no 


rebound, no hepatosplenomegaly, no masses.


EXTREMITIES: 2+ pulses, warm, well-perfused, no edema. 


NEUROLOGICAL: Cranial nerves II through XII grossly intact. Normal speech, gait 

not 


observed.


PSYCH: Normal mood, normal affect.


SKIN: Warm, dry, normal turgor, no rashes or lesions noted


dressing covering left buttock, S/P I&D














 Laboratory Results - last 24 hr











  09/21/17 09/21/17 09/22/17





  16:44 20:54 05:57


 


WBC   


 


RBC   


 


Hgb   


 


Hct   


 


MCV   


 


MCH   


 


MCHC   


 


RDW   


 


Plt Count   


 


MPV   


 


Neutrophils %   


 


Lymphocytes %   


 


Monocytes %   


 


Eosinophils %   


 


Basophils %   


 


Sodium   


 


Potassium   


 


Chloride   


 


Carbon Dioxide   


 


Anion Gap   


 


BUN   


 


Creatinine   


 


POC Glucometer  185  249  167


 


Random Glucose   


 


Calcium   














  09/22/17 09/22/17 09/22/17





  06:30 06:30 11:32


 


WBC  9.3  D  


 


RBC  4.96  


 


Hgb  14.7  


 


Hct  43.2  


 


MCV  87.1  


 


MCH  29.6  


 


MCHC  34.0  


 


RDW  13.0  


 


Plt Count  320  


 


MPV  8.3  


 


Neutrophils %  74.6  


 


Lymphocytes %  13.4  D  


 


Monocytes %  10.4 H  


 


Eosinophils %  1.0  


 


Basophils %  0.6  


 


Sodium   139 


 


Potassium   3.9 


 


Chloride   102 


 


Carbon Dioxide   24 


 


Anion Gap   13 


 


BUN   7  D 


 


Creatinine   0.5 L 


 


POC Glucometer    206


 


Random Glucose   163 H 


 


Calcium   8.8 








Active Medications











Generic Name Dose Route Start Last Admin





  Trade Name Freq  PRN Reason Stop Dose Admin


 


Enoxaparin Sodium  40 mg 09/22/17 10:00 09/22/17 09:36





  Lovenox -  SQ   40 mg





  DAILY ROSS   Administration


 


Hydromorphone HCl  1 mg 09/21/17 15:35 09/22/17 13:32





  Dilaudid Injection -  IVPB   1 mg





  Q4H PRN   Administration





  PAIN   


 


Metronidazole  100 mls @ 100 mls/hr 09/21/17 18:00 09/22/17 09:36





  Flagyl 500mg Premixed Ivpb -  IVPB   100 mls/hr





  Q8H-IV ROSS   Administration


 


Levofloxacin  150 mls @ 100 mls/hr 09/22/17 10:00 09/22/17 10:42





  Levaquin 750 Mg Premixed Ivpb -  IVPB   100 mls/hr





  DAILY ROSS   Administration


 


Insulin Aspart  1 vial 09/21/17 16:30 09/22/17 11:40





  Novolog Vial Sliding Scale -  SQ   2 unit





  ACHS ROSS   Administration





  Protocol   


 


Insulin Detemir  5 units 09/21/17 22:00 09/21/17 21:03





  Levemir Vial  SQ   5 unit





  HS ROSS   Administration











ASSESSMENT/PLAN:


Pelvic CT scan: 9/20/2017: No pelvic mass or fluid collection, no 

lymphadenopathy. inflammatory process without discrete abscess.





ASSESSMENT/PLAN:


 is 37 year old male with no past medical history who presented to 

the ED with one week history of rey-anal abscess was found to have sepsis and 

was admitted for further evaluation and treatment. 





# Sepsis 2/2 rey-anal abscess and cellulitis 


* day of admission wbc 12.4 , HR 84, with source of infection, improving to wbc 

7,  HR 80


* Blood culture,urine culture,  wound  culture pending 


* Iv abx levaquin 500 once and 500 flagyl was given in the ED for one dose


* Continue IV abx daily for 10 days (Levaquin 500 mg qd, Flagyl 500 mg IV q 6 hr

), allergic to penicillin.


* Dilaudid 1 mg IVbp Q4hr PRN


*  surgery consulted for I&D  : Incision and drainage of left rey-

rectal abscess, excisional debridement of necrotic subcutaeous tissue


* HIV screening test negative 


* Zofran for nausea


* 


# Diabetes, new onset  


* Glu 263 on admission , 


* UA : +3 glucose, +2 keton


* HgA1c 10.9


* start levemir 5 unit SQ HS


* BGM , ISS, 


* will monitor blood sugar


* Patient education for short and long term complications  


* encourage weight loss


* Nutritionist consultation 


* 


# elevated blood pressure, resolved likely 2/2 pain 


* 141/88 on admission , normotensive 


* monitor Bp 


* Low sodium diet, diabetic diet


* 


#Proph, 


* DVT : Moderate risk , SCDS both legs , lovenox 40 mg SQ daily.


* GI: No need for now


* 


#FEN:


* 2000 CC NS bolus at admission day , on no fluid now 


* WNL 


* Low sodium, diabetic diet. 


* 


#Dispo: 


* admit to med-surg 


* Possible D/C tomorrow








Visit type





- Emergency Visit


Emergency Visit: Yes


ED Registration Date: 09/20/17


Care time: The patient presented to the Emergency Department on the above date 

and was hospitalized for further evaluation of their emergent condition.





- New Patient


This patient is new to me today: No





- Critical Care


Critical Care patient: No





- Discharge Referral


Referred to [facility] No

## 2017-09-23 VITALS — SYSTOLIC BLOOD PRESSURE: 139 MMHG | DIASTOLIC BLOOD PRESSURE: 76 MMHG | HEART RATE: 83 BPM | TEMPERATURE: 97.7 F

## 2017-09-23 RX ADMIN — ENOXAPARIN SODIUM SCH MG: 40 INJECTION SUBCUTANEOUS at 09:27

## 2017-09-23 RX ADMIN — INSULIN ASPART SCH: 100 INJECTION, SOLUTION INTRAVENOUS; SUBCUTANEOUS at 06:17

## 2017-09-23 RX ADMIN — METRONIDAZOLE SCH MLS/HR: 500 INJECTION, SOLUTION INTRAVENOUS at 09:27

## 2017-09-23 RX ADMIN — METRONIDAZOLE SCH MLS/HR: 500 INJECTION, SOLUTION INTRAVENOUS at 02:15

## 2017-09-23 RX ADMIN — INSULIN ASPART SCH UNIT: 100 INJECTION, SOLUTION INTRAVENOUS; SUBCUTANEOUS at 11:25

## 2017-09-23 RX ADMIN — LEVOFLOXACIN SCH MLS/HR: 5 INJECTION, SOLUTION INTRAVENOUS at 10:35

## 2017-09-23 NOTE — DS
Physical Exam: 


SUBJECTIVE: Patient seen and examined. states pain has improved. only during 

dressing changes. deneis Cp, SOB, fever, chills, N/V/C/D








OBJECTIVE:





 Vital Signs











 Period  Temp  Pulse  Resp  BP Sys/Morejon  Pulse Ox


 


 Last 24 Hr  98 F-98.9 F  66-87  16-20  112-143/70-93  98








PHYSICAL EXAM





GENERAL: The patient is awake, alert, and fully oriented, in no acute distress.


HEAD: Normal with no signs of trauma.


EYES: PERRL, extraocular movements intact, sclera anicteric, conjunctiva clear. 


ENT: Ears normal, nares patent, oropharynx clear without exudates, moist mucous 

membranes.


NECK: Trachea midline, full range of motion, supple. 


LUNGS: Breath sounds equal, clear to auscultation bilaterally, no wheezes, no 

crackles, no accessory muscle use. 


HEART: Regular rate and rhythm, S1, S2 without murmur, rub or gallop.


ABDOMEN: Soft, nontender, nondistended, normoactive bowel sounds, no guarding, 

no rebound, no hepatosplenomegaly, no masses.


EXTREMITIES: 2+ pulses, warm, well-perfused, no edema. 


NEUROLOGICAL: Cranial nerves II through XII grossly intact. Normal speech, gait 

not observed.


PSYCH: Normal mood, normal affect.


SKIN: Warm, dry, normal turgor, no rashes or lesions noted.





LABS


 Laboratory Results - last 24 hr











  09/22/17 09/22/17 09/23/17





  17:22 21:26 11:24


 


POC Glucometer  180  218  251











HOSPITAL COURSE:





Date of Admission:09/20/17





Date of Discharge: 09/23/17








Admitting diagnosis: Gloria-rectal abscess, new onset diabetes





Procedures 9/21 I&D gloria-rectal abscess 





Pre hospital course


38 YO male with no past medical history , who presented to [facility] ed with one week 

history of gloria-anal abscess . He noticed some redness on his buttocks last 

week , that prompted him to see , within 3 days it compose a 2x2 cm 

abscess that start draining some pussy fluids, and blood . patient reports 

fever and chills last night, associated with pain in the area 8/10 improved 

with pain killer and loss of appetite for 3 days because he is scared to use 

the bathroom. Patient was found to be tachycardic of 86, and wbc 12.4 and he 

was admitted to med-surg for sepsis. Patient denies any chest pain, SOB, 

abdominal pain, N/V/D/C, or any urinary symptoms. Patient has a history of 

using diabetes medicine for 3 months but does not know the name, and was 

advised by his doctor to loose weight. Patient is  and only have sex 

with his wife.  he denies any recent travel or sick contact , but has an office 

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





Subsequent hospital course


admitted to medicine. started on levaquin and flagyl. was seen by surgery and 

had I&D on 9/21 with [id number] necrotic debris removed. Cx were sent and only 

showed skin lilo. while admitted discovered to be diabetic with A1c 10.1. 

start on levemir 5 unit and iss. sugars improved. diabetic teaching by 

dietician and RN. counseled on importance of glucose control. d/c home with 

levaquin and flagyl to complete 10 day course. 


Minutes to complete discharge: 40





Discharge Summary


Reason For Visit: PERIRECTAL ABSCESS


Current Active Problems





Diabetes (Acute) 


Hyperglycemia (Acute) 


Obesity (Acute) 


Perirectal abscess (Acute) 


Sepsis (Acute) 











- Instructions


Diet, Activity, Other Instructions: 


You were admitted and treated for an abscess with cellulitis on your buttocks. 

It was drained by surgeon Dr Garcia. Continue to take antibiotics until they are 

finished. Even if your symptoms resolved. Take lactobacillus which is a 

probiotic while on antibiotics. this will prevent development of diarrhea. 


Continue daily wound care as you were shown by the nurse (clean the wound with 

soap and water, pack with kerlix soaked in betadine and place dry clean guaze 

over wound)


 You were prescribed percocet for pain. This medication can cause you to be 

drowsy do not drive or operate heavy machinery while taking this medication. It 

can also cause constipation. ensure you are having daily bowel movements while 

on this medication. 


You were also discovered to be diabetic while you were here. It is very 

important to closely monitor your sugars. Check your sugars every morning when 

you wake up and prior to each meal. Take Levemir 5 units at bedtime. Write down 

your sugars that you get a bring them to your doctor next week. You may need 

further adjustment in your medication. You will need your A1c (diabetic number) 

checked in 3 months. Here it was 10.9


Monitor for signs of low blood sugar. Refer to handout given


Follow a diabetic diet as discussed with dietician


Follow up with your primary care doctor and the surgeon (Dr Garcia) in 1 week





If your buttock pain gets worse or develop high fever (temp >101) return to the 

ER. 








Insulin sliding scale


Sugar   # units of insulin


<200            0


201-250            2


251-300            4


301-350            6


351-400            8


>401            10 and call your primary care doctor


Referrals: 


Tc Garcia MD [Staff Physician] - 


Aaron Perez MD [Staff Physician] - 





- Home Medications


Comprehensive Discharge Medication List: 


Ambulatory Orders





Docusate Sodium [Colace -] 100 mg PO TID #90 capsule 09/21/17 


Oxycodone HCl/Acetaminophen [Percocet 5-325 mg Tablet] 1 - 2 tab PO Q6H #28 tab 

MDD 4 09/21/17 


Insulin (Levemir) [Levemir Flexpen -] 5 units SQ DAILY #1 pen 09/23/17 


Insulin Aspart [Novolog Flexpen] 100 unit SQ AC #2 insuln.pen 09/23/17 


Lactobacillus Acidophilus [Acidophilus Lactobacilli] 1 each PO DAILY #7 capsule 

09/23/17 


Lancets [Lancets Ultra Thin] 1 each  AC #100 each 09/23/17 


Levofloxacin [Levaquin -] 500 mg PO DAILY #7 tablet 09/23/17 


Metronidazole [Flagyl -] 500 mg PO Q8H #22 tablet 09/23/17 


Miscellaneous Medical Supply [Glucometer Device] 1 each NR ASDIR #1 kit 09/23/ 17 


Miscellaneous Medical Supply [Glucometer Test Strips #100] 1 each NR ASDIR #1 

box 09/23/17 


Pen Needle, Diabetic [[place]] 1 each  ACHS #120 dis.needle 09/23/17 








This patient is new to me today: No


Emergency Visit: Yes


ED Registration Date: 09/20/17


Care time: The patient presented to the Emergency Department on the above date 

and was hospitalized for further evaluation of their emergent condition.


Critical Care patient: No





- Discharge Referral


Referred to [facility] No

## 2017-09-24 NOTE — DS
Physical Exam: 


SUBJECTIVE: Patient seen and examined at bed side. He si doing much better , he 

complains of buttock pain only when they change the dress. He terrie fever, 

chills, N/V/D/C. no chest pain or abdominal pain were reported. he terrie any 

urinary symptoms.








OBJECTIVE: non significant





PHYSICAL EXAM





GENERAL: The patient is awake, alert, and fully oriented, in no acute distress.


HEAD: Normal with no signs of trauma.


EYES: sclera anicteric, conjunctiva clear. 


ENT: Ears normal, nares patent, oropharynx clear without exudates, moist mucous 

membranes.


LUNGS: Breath sounds equal, clear to auscultation bilaterally, no wheezes, no 

crackles, no accessory muscle use. 


HEART: Regular rate and rhythm, S1, S2 without murmur, rub or gallop.


ABDOMEN: Soft, nontender, nondistended, normoactive bowel sounds, no guarding, 

no rebound.


EXTREMITIES: 2+ pulses, warm, well-perfused, no edema. 


NEUROLOGICAL: Cranial nerves II through XII grossly intact. Normal speech, gait 

not observed.


PSYCH: Normal mood, normal affect.


SKIN: Warm, dry,  no rashes or lesions noted.


dressing on his left buttocm, S/P I&D.





LABS


 Laboratory Results - last 24 hr











  09/23/17





  11:24


 


POC Glucometer  251











HOSPITAL COURSE:


36 YO male with no past medical history , who presented to [facility] ed with one week 

history of rey-anal abscess . He noticed some redness on his buttocks last 

week , that prompted him to see , within 3 days it compose a 2x2 cm 

abscess that start draining some pussy fluids, and blood . patient reports 

fever and chills last night, associated with pain in the area 8/10 improved 

with pain killer and loss of appetite for 3 days because he is scared to use 

the bathroom. Patient was found to be tachycardic of 86, and wbc 12.4 and he 

was admitted to med-surg for sepsis. Patient denies any chest pain, SOB, 

abdominal pain, N/V/D/C, or any urinary symptoms. Patient has a history of 

using diabetes medicine for 3 months but does not know the name, and was 

advised by his doctor to loose weight. Patient is  and only have sex 

with his wife.  he denies any recent travel or sick contact , but has an office 

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


ER course was notable for:


(1) wbc 12.4, HR 86, /88, Glu 263


(2) 1000 CC NS bolus 


(3) CT A/P shows inflammatory changes but no discrete abscess. 











Date of Admission:09/20/17





Date of Discharge: 09/24/17








Patient presented to the ED with one week history of erythema on his left 

buttocks that progress to 2/2 cm abscess that started draining puss and fluids 

and accompanied by fever and chills , he was admitted for per anal abscess. 


Dr Tc Garcia ,  Patient was started on levaquin and flagyl and general 

surgeon was consulted and performed I&D on 9/21  and removed the necrotic area 

8x8x8 cm in his left buttock and put pack and dressing on it .


Patient needs to continue course of flagyl and levaquin for 10 days total .


Patient needs to change the dressing daily as directed. 


During the hospital course patient was found to have elevated blood sugar with 

HgA1c 10.9 


Patient was started on levemir 5 units at bed time , and insulin sliding scale.

  Patient was consulted on short and long term complication of diabetes 

including but not limited to CAD , PAD, Sexual dysfunction. 


Patient was educated about diabetic diet and importance of loosing weight and 

taking his insulin as prescribed. 


Patient will need to continue taking Abx till finish 


Patient needs to continue using insulin and documenting blood glucose number 


Patient  will follow up with his PCP dr. Aaron Perez within a week


patient will follow up with Dr. Garcia within one week 


Patient was informed to return to ED if the wounds worsen or he develop fever, 

chills. 





Minutes to complete discharge: 30





Discharge Summary


Reason For Visit: PERIRECTAL ABSCESS





- Instructions


Diet, Activity, Other Instructions: 


You were admitted and treated for an abscess with cellulitis on your buttocks. 

It was drained by surgeon Dr Garcia. Continue to take antibiotics until they are 

finished. Even if your symptoms resolved. Take lactobacillus which is a 

probiotic while on antibiotics. this will prevent development of diarrhea. 





Continue daily wound care as you were shown by the nurse (clean the wound with 

soap and water, pack with kerlix soaked in betadine and place dry clean guaze 

over wound)





 You were prescribed percocet for pain. This medication can cause you to be 

drowsy do not drive or operate heavy machinery while taking this medication. It 

can also cause constipation. ensure you are having daily bowel movements while 

on this medication. 


You were also discovered to be diabetic while you were here. It is very 

important to closely monitor your sugars. Check your sugars every morning when 

you wake up and prior to each meal. Take Levemir 5 units at bedtime. Write down 

your sugars that you get a bring them to your doctor next week. You may need 

further adjustment in your medication. You will need your A1c (diabetic number) 

checked in 3 months. Here it was 10.9


Monitor for signs of low blood sugar. Refer to handout given





Follow a diabetic diet as discussed with dietician





Follow up with your primary care doctor and the surgeon (Dr Garcia) in 1 week





If your buttock pain gets worse or develop high fever (temp >101) return to the 

ER. 








Insulin sliding scale


Sugar   # units of insulin


<200            0


201-250            2


251-300            4


301-350            6


351-400            8


>401            10 and call your primary care doctor


Referrals: 


Tc Garcia MD [Staff Physician] - 


Aaron Perez MD [Staff Physician] - 


Disposition: VNS/HOME HEALTH CARE





- Home Medications


Comprehensive Discharge Medication List: 


Ambulatory Orders





Docusate Sodium [Colace -] 100 mg PO TID #90 capsule 09/21/17 


Oxycodone HCl/Acetaminophen [Percocet 5-325 mg Tablet] 1 - 2 tab PO Q6H #28 tab 

MDD 4 09/21/17 


Insulin (Levemir) [Levemir Flexpen -] 5 units SQ DAILY #1 pen 09/23/17 


Insulin Aspart [Novolog Flexpen] 100 unit SQ AC #2 insuln.pen 09/23/17 


Lactobacillus Acidophilus [Acidophilus Lactobacilli] 1 each PO DAILY #7 capsule 

09/23/17 


Lancets [Lancets Ultra Thin] 1 each  AC #100 each 09/23/17 


Levofloxacin [Levaquin -] 500 mg PO DAILY #7 tablet 09/23/17 


Metronidazole [Flagyl -] 500 mg PO Q8H #22 tablet 09/23/17 


Miscellaneous Medical Supply [Glucometer Device] 1 each NR ASDIR #1 kit 09/23/ 17 


Miscellaneous Medical Supply [Glucometer Test Strips #100] 1 each NR ASDIR #1 

box 09/23/17 


Pen Needle, Diabetic [[place]] 1 each  ACHS #120 dis.needle 09/23/17 








This patient is new to me today: No


Emergency Visit: Yes


ED Registration Date: 09/20/17


Care time: The patient presented to the Emergency Department on the above date 

and was hospitalized for further evaluation of their emergent condition.


Critical Care patient: No





- Discharge Referral


Referred to [facility] No

## 2017-09-24 NOTE — DS
Physical Exam: 


SUBJECTIVE: Patient seen and examined








OBJECTIVE:





 Vital Signs











 Period  Temp  Pulse  Resp  BP Sys/Morejon  Pulse Ox


 


 Last 24 Hr  97.7 F  83  20-20  139/76  98








PHYSICAL EXAM





GENERAL: The patient is awake, alert, and fully oriented, in no acute distress.


HEAD: Normal with no signs of trauma.


EYES: PERRL, extraocular movements intact, sclera anicteric, conjunctiva clear. 


ENT: Ears normal, nares patent, oropharynx clear without exudates, moist mucous 

membranes.


NECK: Trachea midline, full range of motion, supple. 


LUNGS: Breath sounds equal, clear to auscultation bilaterally, no wheezes, no 

crackles, no accessory muscle use. 


HEART: Regular rate and rhythm, S1, S2 without murmur, rub or gallop.


ABDOMEN: Soft, nontender, nondistended, normoactive bowel sounds, no guarding, 

no rebound, no hepatosplenomegaly, no masses.


EXTREMITIES: 2+ pulses, warm, well-perfused, no edema. 


NEUROLOGICAL: Cranial nerves II through XII grossly intact. Normal speech, gait 

not observed.


PSYCH: Normal mood, normal affect.


SKIN: Warm, dry, normal turgor, no rashes or lesions noted.





LABS


 Laboratory Results - last 24 hr











  09/23/17





  11:24


 


POC Glucometer  251











HOSPITAL COURSE:





Date of Admission:09/20/17





Date of Discharge: 09/24/17














Discharge Summary


Reason For Visit: PERIRECTAL ABSCESS





- Instructions


Diet, Activity, Other Instructions: 


You were admitted and treated for an abscess with cellulitis on your buttocks. 

It was drained by surgeon Dr Garcia. Continue to take antibiotics until they are 

finished. Even if your symptoms resolved. Take lactobacillus which is a 

probiotic while on antibiotics. this will prevent development of diarrhea. 





Continue daily wound care as you were shown by the nurse (clean the wound with 

soap and water, pack with kerlix soaked in betadine and place dry clean guaze 

over wound)





 You were prescribed percocet for pain. This medication can cause you to be 

drowsy do not drive or operate heavy machinery while taking this medication. It 

can also cause constipation. ensure you are having daily bowel movements while 

on this medication. 


You were also discovered to be diabetic while you were here. It is very 

important to closely monitor your sugars. Check your sugars every morning when 

you wake up and prior to each meal. Take Levemir 5 units at bedtime. Write down 

your sugars that you get a bring them to your doctor next week. You may need 

further adjustment in your medication. You will need your A1c (diabetic number) 

checked in 3 months. Here it was 10.9


Monitor for signs of low blood sugar. Refer to handout given





Follow a diabetic diet as discussed with dietician





Follow up with your primary care doctor and the surgeon (Dr Garcia) in 1 week





If your buttock pain gets worse or develop high fever (temp >101) return to the 

ER. 








Insulin sliding scale


Sugar   # units of insulin


<200            0


201-250            2


251-300            4


301-350            6


351-400            8


>401            10 and call your primary care doctor


Referrals: 


Tc Garcia MD [Staff Physician] - 


Aaron Perez MD [Staff Physician] - 


Disposition: VNS/HOME HEALTH CARE





- Home Medications


Comprehensive Discharge Medication List: 


Ambulatory Orders





Docusate Sodium [Colace -] 100 mg PO TID #90 capsule 09/21/17 


Oxycodone HCl/Acetaminophen [Percocet 5-325 mg Tablet] 1 - 2 tab PO Q6H #28 tab 

MDD 4 09/21/17 


Insulin (Levemir) [Levemir Flexpen -] 5 units SQ DAILY #1 pen 09/23/17 


Insulin Aspart [Novolog Flexpen] 100 unit SQ AC #2 insuln.pen 09/23/17 


Lactobacillus Acidophilus [Acidophilus Lactobacilli] 1 each PO DAILY #7 capsule 

09/23/17 


Lancets [Lancets Ultra Thin] 1 each  AC #100 each 09/23/17 


Levofloxacin [Levaquin -] 500 mg PO DAILY #7 tablet 09/23/17 


Metronidazole [Flagyl -] 500 mg PO Q8H #22 tablet 09/23/17 


Miscellaneous Medical Supply [Glucometer Device] 1 each NR ASDIR #1 kit 09/23/ 17 


Miscellaneous Medical Supply [Glucometer Test Strips #100] 1 each  ASDIR #1 

box 09/23/17 


Pen Needle, Diabetic [[place]] 1 each  ACHS #120 dis.needle 09/23/17 











- Discharge Referral


Referred to [facility] No

## 2017-09-25 NOTE — PATH
Surgical Pathology Report



Patient Name:  CATRACHITA CAMACHO

Accession #:  [id number]

Med. Rec. #:  [id number]                                                        

   /Age/Gender:  1980 (Age: 37) / M

Account:  [id number]                                                          

             Location: [facility] MED/SURG

Taken:  2017

Received:  2017

Reported:  2017

Physicians:  DAISY Cifuentes M.D.

  



Specimen(s) Received

 ABSCESS CAVITY CONTENTS NECROTIC SUBCUTANOUS TISSUE PERIRECTAL ABSCESS LEFT 





Clinical History

Perirectal abscess







Final Diagnosis

SOFT TISSUE, LEFT PERIRECTAL, EXCISION:

PURULENT MATERIAL WITH FIBROTIC WALL CONSISTENT WITH ABSCESS, ALONG WITH ADIPOSE

TISSUE WITH AREAS OF FAT NECROSIS AND CHRONIC INFLAMMATION.



Comment:  Recommend correlation with clinical findings and follow up as

clinically indicated.





***Electronically Signed***

Sammy Hargrove M.D.





Gross Description

Received in formalin labeled "abscess cavity contents and necrotic subcutaneous

tissue left perirectal," is a 3.0 x 2.4 x 0.6 cm aggregate of tan brown,

necrotic portions of soft tissue. Representative sections are submitted in one

cassette.





[facility]

## 2020-02-04 ENCOUNTER — HOSPITAL ENCOUNTER (EMERGENCY)
Dept: HOSPITAL 74 - JER | Age: 40
Discharge: HOME | End: 2020-02-04
Payer: COMMERCIAL

## 2020-02-04 VITALS — DIASTOLIC BLOOD PRESSURE: 86 MMHG | SYSTOLIC BLOOD PRESSURE: 136 MMHG

## 2020-02-04 VITALS — BODY MASS INDEX: 37.1 KG/M2

## 2020-02-04 VITALS — HEART RATE: 73 BPM | TEMPERATURE: 97.8 F

## 2020-02-04 DIAGNOSIS — K42.9: Primary | ICD-10-CM

## 2020-02-04 DIAGNOSIS — K80.20: ICD-10-CM

## 2020-02-04 DIAGNOSIS — E11.9: ICD-10-CM

## 2020-02-04 DIAGNOSIS — Z79.84: ICD-10-CM

## 2020-02-04 LAB
ALBUMIN SERPL-MCNC: 3.8 G/DL (ref 3.4–5)
ALP SERPL-CCNC: 155 U/L (ref 45–117)
ALT SERPL-CCNC: 57 U/L (ref 13–61)
ANION GAP SERPL CALC-SCNC: 9 MMOL/L (ref 8–16)
AST SERPL-CCNC: 24 U/L (ref 15–37)
BASOPHILS # BLD: 0.7 % (ref 0–2)
BILIRUB SERPL-MCNC: 0.2 MG/DL (ref 0.2–1)
BUN SERPL-MCNC: 12.5 MG/DL (ref 7–18)
CALCIUM SERPL-MCNC: 8.9 MG/DL (ref 8.5–10.1)
CHLORIDE SERPL-SCNC: 108 MMOL/L (ref 98–107)
CO2 SERPL-SCNC: 23 MMOL/L (ref 21–32)
CREAT SERPL-MCNC: 0.7 MG/DL (ref 0.55–1.3)
DEPRECATED RDW RBC AUTO: 13.6 % (ref 11.9–15.9)
EOSINOPHIL # BLD: 4.1 % (ref 0–4.5)
GLUCOSE SERPL-MCNC: 213 MG/DL (ref 74–106)
HCT VFR BLD CALC: 46.3 % (ref 35.4–49)
HGB BLD-MCNC: 15.5 GM/DL (ref 11.7–16.9)
LIPASE SERPL-CCNC: 197 U/L (ref 73–393)
LYMPHOCYTES # BLD: 26.8 % (ref 8–40)
MCH RBC QN AUTO: 29.4 PG (ref 25.7–33.7)
MCHC RBC AUTO-ENTMCNC: 33.5 G/DL (ref 32–35.9)
MCV RBC: 88 FL (ref 80–96)
MONOCYTES # BLD AUTO: 12 % (ref 3.8–10.2)
NEUTROPHILS # BLD: 56.4 % (ref 42.8–82.8)
PLATELET # BLD AUTO: 236 K/MM3 (ref 134–434)
PMV BLD: 8.9 FL (ref 7.5–11.1)
POTASSIUM SERPLBLD-SCNC: 3.9 MMOL/L (ref 3.5–5.1)
PROT SERPL-MCNC: 6.8 G/DL (ref 6.4–8.2)
RBC # BLD AUTO: 5.26 M/MM3 (ref 4–5.6)
SODIUM SERPL-SCNC: 139 MMOL/L (ref 136–145)
WBC # BLD AUTO: 5.8 K/MM3 (ref 4–10)

## 2020-02-04 PROCEDURE — 3E0337Z INTRODUCTION OF ELECTROLYTIC AND WATER BALANCE SUBSTANCE INTO PERIPHERAL VEIN, PERCUTANEOUS APPROACH: ICD-10-PCS | Performed by: STUDENT IN AN ORGANIZED HEALTH CARE EDUCATION/TRAINING PROGRAM

## 2020-02-04 NOTE — PDOC
History of Present Illness





- General


History Source: Patient


Exam Limitations: Clinical Condition





- History of Present Illness


Initial Comments: 





02/04/20 16:02


Patient with a history of non-insulin-dependent diabetes on metformin presented 

with complaint of one-week history of periumbilical pain and feeling of ball in 

umbilicus.  Patient also reported 2-day history of diarrhea.  Denies nausea, 

vomiting, fevers, chills, shortness of breath, chest pain, dizziness.  Denies 

any other symptoms.  Patient did not take anything for symptoms


Is this a multiple visit Asthma Patient?: No


Timing/Duration: 1 week





<Rajeev Silva - Last Filed: 02/04/20 17:36>





<Lele Justice - Last Filed: 02/07/20 10:04>





- General


Chief Complaint: Pain, Acute


Stated Complaint: ABD PAIN


Time Seen by Provider: 02/04/20 15:21





Past History





- Past Medical History


Anemia: No


Asthma: No


COPD: No





- Psycho Social/Smoking Cessation Hx


Smoking History: Never smoked


Have you smoked in the past 12 months: No


If you are a former smoker, when did you quit?: 13 YRS AGO


Hx Alcohol Use: No


Drug/Substance Use Hx: No


Substance Use Type: None


Hx Substance Use Treatment: No





<Rajeev Silva - Last Filed: 02/04/20 17:36>





<Lele Justice - Last Filed: 02/07/20 10:04>





- Past Medical History


Allergies/Adverse Reactions: 


 Allergies











Allergy/AdvReac Type Severity Reaction Status Date / Time


 


Penicillins Allergy Mild Hives Verified 02/04/20 13:30











Home Medications: 


Ambulatory Orders





Famotidine [Pepcid -] 40 mg PO DAILY #7 tablet 02/04/20 


Mag Hydrox/Aluminum Hyd/Simeth [Maalox Advanced Suspension] 30 ml PO Q8H PRN #

200 ml 02/04/20 


Metformin HCl [Glucophage] 500 mg PO BID 02/04/20 


Ondansetron [Zofran -] 4 mg PO Q8H PRN #21 tablet 02/04/20 


Rosuvastatin Calcium [Crestor] 10 mg PO HS 02/04/20 











**Review of Systems





- Review of Systems


Able to Perform ROS?: Yes


Is the patient limited English proficient: No


Constitutional: No: Chills, Fever, Malaise


HEENTM: No: Symptoms Reported, See HPI, Eye Pain, Blurred Vision, Tearing, 

Recent change in vision, Double Vision, Cataracts, Ear Pain, Ocular Prothesis, 

Ear Discharge, Nose Pain, Nose Congestion, Tinnitus, Nose Bleeding, Hearing Loss

, Throat Pain, Throat Swelling, Mouth Pain, Dental Problems, Difficulty 

Swallowing, Mouth Swelling, Other


Respiratory: No: Symptoms reported, See HPI, Cough, Orthopnea, Shortness of 

Breath, SOB with Exertion, SOB at Rest, Stridor, Wheezing, Productive cough, 

Hemoptysis, Other


Cardiac (ROS): No: Symptoms Reported, See HPI, Chest Pain, Edema, Irregular 

Heart Rate, Lightheadedness, Palpitations, Syncope, Chest Tightness, Other


ABD/GI: Yes: Symptoms Reported, See HPI, Diarrhea, Abdominal cramping (rey-

umbilical).  No: Abd. Pain w/ defecation, Blood Streaked Bowels, Constipated, 

Difficulty Swallowing, Nausea, Poor Fluid Intake, Rectal Bleeding, Vomiting


: No: Symptoms Reported, Burning, Discharge, Frequency, Urgency


All Other Systems: Reviewed and Negative





<Rajeev Silva - Last Filed: 02/04/20 17:36>





*Physical Exam





- Vital Signs


 Last Vital Signs











Temp Pulse Resp BP Pulse Ox


 


 97.8 F   73   18   143/91   97 


 


 02/04/20 13:33  02/04/20 13:33  02/04/20 13:33  02/04/20 13:33  02/04/20 13:33














- Physical Exam





02/04/20 16:06


GENERAL:


Well developed, well nourished. Awake and alert. No acute distress.


HEENT:  Normocephalic, atraumatic. PERRLA, EOMI. No conjunctival pallor. Sclera 

are non-icteric. Moist mucous membranes. Oropharynx is clear.


NECK: 


Supple. Full ROM. 


CARDIOVASCULAR:


Regular rate and rhythm. No murmurs, rubs, or gallops. Distal pulses are 2+ and 

symmetric. 


PULMONARY: 


No evidence of respiratory distress. Lungs clear to auscultation bilaterally. 

No wheezing, rales or rhonchi.


ABDOMINAL:


Soft.  Mild periumbilical tenderness. Non-distended. No rebound or guarding. No 

organomegaly. Normoactive bowel sounds. 


MUSCULOSKELETAL 


Normal range of motion at all joints. 


SKIN: 


Warm and dry. Normal capillary refill. No rashes. No jaundice. 


NEUROLOGICAL: 


Alert, awake, appropriate.  Gait is normal without ataxia.


PSYCHIATRIC: 


Cooperative. Good eye contact. Appropriate mood


General Appearance: Yes: Nourished, Appropriately Dressed, Mild Distress





<Rajeev Silva - Last Filed: 02/04/20 17:36>





- Vital Signs


 Last Vital Signs











Temp Pulse Resp BP Pulse Ox


 


 97.8 F   73   18   136/86   98 


 


 02/04/20 13:33  02/04/20 17:48  02/04/20 17:48  02/04/20 17:48  02/04/20 17:48














<VinhLele medrano - Last Filed: 02/07/20 10:04>





ED Treatment Course





- LABORATORY


CBC & Chemistry Diagram: 


 02/04/20 15:52





 02/04/20 15:55





- RADIOLOGY


Radiology Studies Ordered: 














 Category Date Time Status


 


 ABDOMEN US [US] Stat Ultrasound  02/04/20 15:45 Ordered














<Rajeev Silva - Last Filed: 02/04/20 17:36>





- LABORATORY


CBC & Chemistry Diagram: 


 02/04/20 15:52





 02/04/20 15:55





- ADDITIONAL ORDERS


Additional order review: 


 











  02/04/20





  15:52


 


RBC  5.26


 


MCV  88.0


 


MCHC  33.5


 


RDW  13.6


 


MPV  8.9


 


Neutrophils %  56.4  D


 


Lymphocytes %  26.8  D


 


Monocytes %  12.0 H


 


Eosinophils %  4.1  D


 


Basophils %  0.7














- Medications


Given in the ED: 


ED Medications














Discontinued Medications














Generic Name Dose Route Start Last Admin





  Trade Name Freq  PRN Reason Stop Dose Admin


 


Al Hydroxide/Mg Hydroxide  30 ml  02/04/20 15:35  02/04/20 16:42





  Mylanta Oral Suspension -  PO  02/04/20 15:36  30 ml





  ONCE ONE   Administration





     





     





     





     


 


Famotidine/Sodium Chloride  20 mg in 50 mls @ 100 mls/hr  02/04/20 15:35  02/04/ 20 16:42





  Pepcid 20 Mg Premixed Ivpb -  IVPB  02/04/20 16:04  100 mls/hr





  ONCE ONE   Administration





     





     





     





     


 


Sodium Chloride  1,000 mls @ 1,000 mls/hr  02/04/20 15:35  02/04/20 16:42





  Normal Saline -  IV  02/04/20 16:34  1,000 mls/hr





  ASDIR STA   Administration





     





     





     





     














<Lele Justice - Last Filed: 02/07/20 10:04>





Medical Decision Making





- Medical Decision Making





02/04/20 16:04


Patient with a history of non-insulin-dependent diabetes on metformin presented 

with complaint of one-week history of periumbilical pain and feeling of ball in 

umbilicus.  Patient also reported 2-day history of diarrhea.  Denies nausea, 

vomiting, fevers, chills, shortness of breath, chest pain, dizziness.  Denies 

any other symptoms.  Patient did not take anything for symptoms


Exam significant for moderate periumbilical tenderness without guarding or 

rebound.  Patient afebrile.  Tiny palpable ball in the umbilicus which is 

likely due to small hernia versus umbilical stalk.


CBC, CMP and lipase lab ordered to acute abnormality.  Abdominal ultrasound 

ordered to rule out hernia.  Maalox 30 mL p.o. and Pepcid ordered for abdominal 

discomfort.  Treat based on lab and imaging results


02/04/20 16:54


Abdominal ultrasound shows small umbilical hernia no strangulated which explain 

patient periumbilical pain.  Small 1 cm gallstone seen on ultrasound with 

evidence of cholecystitis.  Labs still pending


02/04/20 17:21


CBC and chemistry lab unremarkable.  Patient stable for discharge on MiraLAX 

and Pepcid as needed for abdominal discomfort with advised to increase fluid 

intake and follow-up with general surgeon for management of gallstone and 

umbilical hernia.  Plan discussed with patient patient agrees with plan.  

Patient reported improvement in abdominal pain and stable for discharge.  

Patient eating sandwich without any abdominal discomfort





<Rajeev Silva - Last Filed: 02/04/20 17:36>





- Medical Decision Making





The patient was seen and evaluated in conjunction with ROSALIE Silva under my 

direct supervision, ancillary studies were reviewed.  I independently 

interviewed and evaluated the patient and I agree with the plan as outlined by 

ROSALIE Silva. 





40y M hx of NIDDM presents with 1 week of abd pain/mass associated with 2 day 

hx of dairrhea w/o f/c, n/v, cp, sob, back pain, dysuria. on exam pt has a 

small palpable mass near umbilicus - suspect small umbilical hernia that is 

reducible. US confirms the same. labs unremarkble. pt dc with out pt fu








<Lele Justice - Last Filed: 02/07/20 10:04>





Discharge





- Discharge Information


Problems reviewed: Yes





- Admission


No





<Rajeev Silva - Last Filed: 02/04/20 17:36>





<Lele Justice - Last Filed: 02/07/20 10:04>





- Discharge Information


Clinical Impression/Diagnosis: 


 Umbilical hernia without obstruction or gangrene





Gallstone


Qualifiers:


 Cholecystitis presence: without cholecystitis Biliary obstruction: without 

biliary obstruction Qualified Code(s): K80.20 - Calculus of gallbladder without 

cholecystitis without obstruction





Condition: Stable


Disposition: HOME





- Additional Discharge Information


Prescriptions: 


Famotidine [Pepcid -] 40 mg PO DAILY #7 tablet


Mag Hydrox/Aluminum Hyd/Simeth [Maalox Advanced Suspension] 30 ml PO Q8H PRN #

200 ml


 PRN Reason: abdominal discomfort


Ondansetron [Zofran -] 4 mg PO Q8H PRN #21 tablet


 PRN Reason: nausea





- Follow up/Referral


Referrals: 


Sharif Ortiz MD [Staff Physician] - 





- Patient Discharge Instructions


Patient Printed Discharge Instructions:  DI for Gallstones, Abdominal Hernia


Additional Instructions: 


Your blood work is normal.  Your abdominal ultrasound shows small umbilical 

hernia which is the likely of the cause of umbilical pain and also shows small 

nonobstructing gallstone.  Take prescribed medication for symptoms abdominal 

pain.  Follow-up with referred abdominal surgeon as soon as possible for follow-

up care for gallstone and umbilical hernia





- Post Discharge Activity
